# Patient Record
Sex: MALE | Race: WHITE
[De-identification: names, ages, dates, MRNs, and addresses within clinical notes are randomized per-mention and may not be internally consistent; named-entity substitution may affect disease eponyms.]

---

## 2018-05-11 ENCOUNTER — HOSPITAL ENCOUNTER (OUTPATIENT)
Dept: HOSPITAL 47 - LABWHC1 | Age: 43
Discharge: HOME | End: 2018-05-11
Payer: MEDICARE

## 2018-05-11 DIAGNOSIS — I10: ICD-10-CM

## 2018-05-11 DIAGNOSIS — E03.9: Primary | ICD-10-CM

## 2018-05-11 DIAGNOSIS — M10.9: ICD-10-CM

## 2018-05-11 LAB
ALBUMIN SERPL-MCNC: 4.3 G/DL (ref 3.5–5)
ALP SERPL-CCNC: 91 U/L (ref 38–126)
ALT SERPL-CCNC: 89 U/L (ref 21–72)
ANION GAP SERPL CALC-SCNC: 15 MMOL/L
AST SERPL-CCNC: 47 U/L (ref 17–59)
BASOPHILS # BLD AUTO: 0 K/UL (ref 0–0.2)
BASOPHILS NFR BLD AUTO: 0 %
BUN SERPL-SCNC: 12 MG/DL (ref 9–20)
CALCIUM SPEC-MCNC: 10 MG/DL (ref 8.4–10.2)
CHLORIDE SERPL-SCNC: 103 MMOL/L (ref 98–107)
CHOLEST SERPL-MCNC: 159 MG/DL (ref ?–200)
CO2 SERPL-SCNC: 25 MMOL/L (ref 22–30)
EOSINOPHIL # BLD AUTO: 0.1 K/UL (ref 0–0.7)
EOSINOPHIL NFR BLD AUTO: 1 %
ERYTHROCYTE [DISTWIDTH] IN BLOOD BY AUTOMATED COUNT: 5.36 M/UL (ref 4.3–5.9)
ERYTHROCYTE [DISTWIDTH] IN BLOOD: 12.6 % (ref 11.5–15.5)
GLUCOSE SERPL-MCNC: 97 MG/DL (ref 74–99)
HBA1C MFR BLD: 6.1 % (ref 4–6)
HCT VFR BLD AUTO: 48.1 % (ref 39–53)
HDLC SERPL-MCNC: 39 MG/DL (ref 40–60)
HGB BLD-MCNC: 16.3 GM/DL (ref 13–17.5)
LDLC SERPL CALC-MCNC: 94 MG/DL (ref 0–99)
LYMPHOCYTES # SPEC AUTO: 1.5 K/UL (ref 1–4.8)
LYMPHOCYTES NFR SPEC AUTO: 26 %
MCH RBC QN AUTO: 30.4 PG (ref 25–35)
MCHC RBC AUTO-ENTMCNC: 33.8 G/DL (ref 31–37)
MCV RBC AUTO: 89.8 FL (ref 80–100)
MONOCYTES # BLD AUTO: 0.4 K/UL (ref 0–1)
MONOCYTES NFR BLD AUTO: 6 %
NEUTROPHILS # BLD AUTO: 3.9 K/UL (ref 1.3–7.7)
NEUTROPHILS NFR BLD AUTO: 66 %
PLATELET # BLD AUTO: 185 K/UL (ref 150–450)
POTASSIUM SERPL-SCNC: 4.4 MMOL/L (ref 3.5–5.1)
PROT SERPL-MCNC: 7.6 G/DL (ref 6.3–8.2)
SODIUM SERPL-SCNC: 143 MMOL/L (ref 137–145)
TRIGL SERPL-MCNC: 130 MG/DL (ref ?–150)
URATE SERPL-MCNC: 8.6 MG/DL (ref 3.5–8.5)
WBC # BLD AUTO: 5.9 K/UL (ref 3.8–10.6)

## 2018-05-11 PROCEDURE — 80061 LIPID PANEL: CPT

## 2018-05-11 PROCEDURE — 80053 COMPREHEN METABOLIC PANEL: CPT

## 2018-05-11 PROCEDURE — 85025 COMPLETE CBC W/AUTO DIFF WBC: CPT

## 2018-05-11 PROCEDURE — 84443 ASSAY THYROID STIM HORMONE: CPT

## 2018-05-11 PROCEDURE — 36415 COLL VENOUS BLD VENIPUNCTURE: CPT

## 2018-05-11 PROCEDURE — 83036 HEMOGLOBIN GLYCOSYLATED A1C: CPT

## 2018-05-11 PROCEDURE — 84550 ASSAY OF BLOOD/URIC ACID: CPT

## 2019-03-18 ENCOUNTER — HOSPITAL ENCOUNTER (INPATIENT)
Dept: HOSPITAL 47 - EC | Age: 44
LOS: 1 days | Discharge: ANOTHER HOSPITAL | DRG: 291 | End: 2019-03-19
Attending: HOSPITALIST | Admitting: HOSPITALIST
Payer: MEDICARE

## 2019-03-18 DIAGNOSIS — I42.8: ICD-10-CM

## 2019-03-18 DIAGNOSIS — J96.01: ICD-10-CM

## 2019-03-18 DIAGNOSIS — I47.1: ICD-10-CM

## 2019-03-18 DIAGNOSIS — I13.0: Primary | ICD-10-CM

## 2019-03-18 DIAGNOSIS — J45.909: ICD-10-CM

## 2019-03-18 DIAGNOSIS — E66.01: ICD-10-CM

## 2019-03-18 DIAGNOSIS — E78.5: ICD-10-CM

## 2019-03-18 DIAGNOSIS — M10.9: ICD-10-CM

## 2019-03-18 DIAGNOSIS — I48.91: ICD-10-CM

## 2019-03-18 DIAGNOSIS — Z91.041: ICD-10-CM

## 2019-03-18 DIAGNOSIS — Z86.19: ICD-10-CM

## 2019-03-18 DIAGNOSIS — I50.23: ICD-10-CM

## 2019-03-18 DIAGNOSIS — Z77.120: ICD-10-CM

## 2019-03-18 DIAGNOSIS — E87.1: ICD-10-CM

## 2019-03-18 DIAGNOSIS — F79: ICD-10-CM

## 2019-03-18 DIAGNOSIS — M19.90: ICD-10-CM

## 2019-03-18 DIAGNOSIS — F41.0: ICD-10-CM

## 2019-03-18 DIAGNOSIS — R57.0: ICD-10-CM

## 2019-03-18 DIAGNOSIS — N18.9: ICD-10-CM

## 2019-03-18 DIAGNOSIS — I44.1: ICD-10-CM

## 2019-03-18 DIAGNOSIS — G47.30: ICD-10-CM

## 2019-03-18 DIAGNOSIS — N17.0: ICD-10-CM

## 2019-03-18 DIAGNOSIS — G47.00: ICD-10-CM

## 2019-03-18 DIAGNOSIS — Z79.4: ICD-10-CM

## 2019-03-18 DIAGNOSIS — Z79.899: ICD-10-CM

## 2019-03-18 DIAGNOSIS — T50.8X5A: ICD-10-CM

## 2019-03-18 DIAGNOSIS — E87.5: ICD-10-CM

## 2019-03-18 DIAGNOSIS — E11.22: ICD-10-CM

## 2019-03-18 DIAGNOSIS — Z79.82: ICD-10-CM

## 2019-03-18 DIAGNOSIS — I48.92: ICD-10-CM

## 2019-03-18 DIAGNOSIS — H53.489: ICD-10-CM

## 2019-03-18 DIAGNOSIS — E87.2: ICD-10-CM

## 2019-03-18 DIAGNOSIS — I34.0: ICD-10-CM

## 2019-03-18 DIAGNOSIS — K59.00: ICD-10-CM

## 2019-03-18 DIAGNOSIS — Z79.51: ICD-10-CM

## 2019-03-18 LAB
ALBUMIN SERPL-MCNC: 3.7 G/DL (ref 3.5–5)
ALP SERPL-CCNC: 132 U/L (ref 38–126)
ALT SERPL-CCNC: 367 U/L (ref 21–72)
ANION GAP SERPL CALC-SCNC: 13 MMOL/L
APTT BLD: 23.7 SEC (ref 22–30)
AST SERPL-CCNC: 131 U/L (ref 17–59)
BASOPHILS # BLD AUTO: 0.1 K/UL (ref 0–0.2)
BASOPHILS NFR BLD AUTO: 1 %
BUN SERPL-SCNC: 27 MG/DL (ref 9–20)
CALCIUM SPEC-MCNC: 8.9 MG/DL (ref 8.4–10.2)
CHLORIDE SERPL-SCNC: 94 MMOL/L (ref 98–107)
CO2 SERPL-SCNC: 24 MMOL/L (ref 22–30)
D DIMER PPP FEU-MCNC: 10.15 MG/L FEU (ref ?–0.6)
EOSINOPHIL # BLD AUTO: 0.2 K/UL (ref 0–0.7)
EOSINOPHIL NFR BLD AUTO: 2 %
ERYTHROCYTE [DISTWIDTH] IN BLOOD BY AUTOMATED COUNT: 4.92 M/UL (ref 4.3–5.9)
ERYTHROCYTE [DISTWIDTH] IN BLOOD: 14.3 % (ref 11.5–15.5)
GLUCOSE BLD-MCNC: 134 MG/DL (ref 75–99)
GLUCOSE BLD-MCNC: 148 MG/DL (ref 75–99)
GLUCOSE SERPL-MCNC: 126 MG/DL (ref 74–99)
HCT VFR BLD AUTO: 46.7 % (ref 39–53)
HGB BLD-MCNC: 14.7 GM/DL (ref 13–17.5)
INR PPP: 1.2 (ref ?–1.2)
LYMPHOCYTES # SPEC AUTO: 1.5 K/UL (ref 1–4.8)
LYMPHOCYTES NFR SPEC AUTO: 15 %
MAGNESIUM SPEC-SCNC: 2.1 MG/DL (ref 1.6–2.3)
MCH RBC QN AUTO: 29.9 PG (ref 25–35)
MCHC RBC AUTO-ENTMCNC: 31.5 G/DL (ref 31–37)
MCV RBC AUTO: 94.9 FL (ref 80–100)
MONOCYTES # BLD AUTO: 0.7 K/UL (ref 0–1)
MONOCYTES NFR BLD AUTO: 7 %
NEUTROPHILS # BLD AUTO: 7.5 K/UL (ref 1.3–7.7)
NEUTROPHILS NFR BLD AUTO: 75 %
PLATELET # BLD AUTO: 249 K/UL (ref 150–450)
POTASSIUM SERPL-SCNC: 4.5 MMOL/L (ref 3.5–5.1)
PROT SERPL-MCNC: 7 G/DL (ref 6.3–8.2)
PT BLD: 12.4 SEC (ref 9–12)
SODIUM SERPL-SCNC: 131 MMOL/L (ref 137–145)
WBC # BLD AUTO: 10.1 K/UL (ref 3.8–10.6)

## 2019-03-18 PROCEDURE — 80306 DRUG TEST PRSMV INSTRMNT: CPT

## 2019-03-18 PROCEDURE — 83735 ASSAY OF MAGNESIUM: CPT

## 2019-03-18 PROCEDURE — 94002 VENT MGMT INPAT INIT DAY: CPT

## 2019-03-18 PROCEDURE — 99291 CRITICAL CARE FIRST HOUR: CPT

## 2019-03-18 PROCEDURE — 96368 THER/DIAG CONCURRENT INF: CPT

## 2019-03-18 PROCEDURE — 83880 ASSAY OF NATRIURETIC PEPTIDE: CPT

## 2019-03-18 PROCEDURE — 93306 TTE W/DOPPLER COMPLETE: CPT

## 2019-03-18 PROCEDURE — 81001 URINALYSIS AUTO W/SCOPE: CPT

## 2019-03-18 PROCEDURE — 96376 TX/PRO/DX INJ SAME DRUG ADON: CPT

## 2019-03-18 PROCEDURE — 85730 THROMBOPLASTIN TIME PARTIAL: CPT

## 2019-03-18 PROCEDURE — 76770 US EXAM ABDO BACK WALL COMP: CPT

## 2019-03-18 PROCEDURE — 85610 PROTHROMBIN TIME: CPT

## 2019-03-18 PROCEDURE — 36415 COLL VENOUS BLD VENIPUNCTURE: CPT

## 2019-03-18 PROCEDURE — 94640 AIRWAY INHALATION TREATMENT: CPT

## 2019-03-18 PROCEDURE — 84484 ASSAY OF TROPONIN QUANT: CPT

## 2019-03-18 PROCEDURE — 96365 THER/PROPH/DIAG IV INF INIT: CPT

## 2019-03-18 PROCEDURE — 80061 LIPID PANEL: CPT

## 2019-03-18 PROCEDURE — 71046 X-RAY EXAM CHEST 2 VIEWS: CPT

## 2019-03-18 PROCEDURE — 85379 FIBRIN DEGRADATION QUANT: CPT

## 2019-03-18 PROCEDURE — 96366 THER/PROPH/DIAG IV INF ADDON: CPT

## 2019-03-18 PROCEDURE — 71275 CT ANGIOGRAPHY CHEST: CPT

## 2019-03-18 PROCEDURE — 82805 BLOOD GASES W/O2 SATURATION: CPT

## 2019-03-18 PROCEDURE — 85025 COMPLETE CBC W/AUTO DIFF WBC: CPT

## 2019-03-18 PROCEDURE — 71045 X-RAY EXAM CHEST 1 VIEW: CPT

## 2019-03-18 PROCEDURE — 80053 COMPREHEN METABOLIC PANEL: CPT

## 2019-03-18 PROCEDURE — 87502 INFLUENZA DNA AMP PROBE: CPT

## 2019-03-18 PROCEDURE — 84100 ASSAY OF PHOSPHORUS: CPT

## 2019-03-18 PROCEDURE — 96375 TX/PRO/DX INJ NEW DRUG ADDON: CPT

## 2019-03-18 PROCEDURE — 96360 HYDRATION IV INFUSION INIT: CPT

## 2019-03-18 PROCEDURE — 80048 BASIC METABOLIC PNL TOTAL CA: CPT

## 2019-03-18 RX ADMIN — HEPARIN SODIUM SCH MLS/HR: 10000 INJECTION, SOLUTION INTRAVENOUS at 11:37

## 2019-03-18 RX ADMIN — FUROSEMIDE SCH MLS/HR: 10 INJECTION, SOLUTION INTRAMUSCULAR; INTRAVENOUS at 17:39

## 2019-03-18 RX ADMIN — NICARDIPINE HYDROCHLORIDE SCH MLS/HR: 2.5 INJECTION INTRAVENOUS at 14:41

## 2019-03-18 RX ADMIN — DOCUSATE SODIUM SCH MG: 100 CAPSULE, LIQUID FILLED ORAL at 21:04

## 2019-03-18 RX ADMIN — FLUTICASONE PROPIONATE SCH PUFF: 44 AEROSOL, METERED RESPIRATORY (INHALATION) at 21:23

## 2019-03-18 RX ADMIN — NITROGLYCERIN SCH INCH: 20 OINTMENT TOPICAL at 17:37

## 2019-03-18 NOTE — XR
EXAMINATION TYPE: XR chest 2V

 

DATE OF EXAM: 3/18/2019

 

COMPARISON: 6/21/2016

 

HISTORY: Shortness of breath

 

TECHNIQUE:  Frontal and lateral views of the chest are obtained.

 

FINDINGS:  There is enlargement of the cardiomediastinal silhouette, although exaggerated by techniqu
e this appears to have increased from the prior of 6/21/2016. Mild interstitial pulmonary edema and p
ulmonary vascular congestion is also seen throughout. Left midlung bandlike atelectasis is also seen.
 Osseous structures are grossly intact.

 

IMPRESSION:  Sequela of interstitial phase of decompensated congestive heart failure. Additional band
like left midlung atelectasis is seen. Given the interval increase in cardiomegaly in comparison to t
he prior echocardiogram could assess for additional component of a possible pericardial effusion.

## 2019-03-18 NOTE — CONS
CONSULTATION



This is a 43-year-old gentleman who was brought to the emergency room with complaints

of chest pain and shortness of breath.  EN route, EKG raised the possibility of

precordial ST elevation.  However, I came down to the emergency room as soon as he

arrived and we looked at the EKG.  There is no evidence to suggest any ST-elevation MI.

He does have evidence of what seems to be an atrial fibrillation with moderate

ventricular rate and LVH by voltage criteria with nonspecific ST-T changes.

Possibility of a flutter with a 2:1 conduction also should be considered.  The patient

is not a very good historian.  He really does not give me any meaningful history.  He

indicates to me that he was trying to go to his doctor today and he developed shortness

of breath.  He does not remember the name of the doctor.  He does have diabetes.  He

has what seems to be hypertension and also underlying mental health issues as well.  He

indicates to me that he has hypertension and he also has type 2 diabetes and takes

metformin and also a cholesterol medication.  At the time of my evaluation, he has no

chest pain. His  shortness of breath has improved.  He feels better.  He denies any

chest discomfort.



PAST MEDICAL HISTORY:

Probably has type 2 diabetes, obesity and hyperlipidemia.  He may have underlying

mental health issues, but it is unclear and I cannot obtain any meaningful history.



SOCIAL HISTORY:

The best information I can get from him is that he is not a smoker and he does consume

alcohol occasionally.  He has no history of any recreational drug use.



MEDICATIONS:

Medications include metformin 500 mg daily, simvastatin 10 mg daily. He takes Cymbalta

and also takes some inhalers.  He takes aspirin 1 tablet daily.



ALLERGIES:

He is allergic to IODINE CONTRAST.



REVIEW OF SYSTEMS:

Unable to obtain a meaningful history, but patient is not in any distress.  He denies

any hematemesis, melena, genitourinary symptoms, fever with chills or cough with

expectoration.



PHYSICAL EXAMINATION:

On examination, blood pressure is 118/70, pulse rate is about 124 per minute irregular:

HEENT: Unremarkable. Fundus was not examined by me.

Neck is supple. There is JVD of 1 cm.  No carotid bruit.

Heart exam reveals S1, S2 with tachycardia.  No significant murmurs.

Lungs reveal bilateral diminished air entry over both bases.

Abdomen is soft, nontender.

Lower extremities reveal trace edema and palpable pulses.

Central nervous system grossly within normal limits.



EKG revealed atrial flutter with 2:1 with evidence of some nonspecific ST changes and

voltage criteria for LVH.



I reviewed the old chart and noted that this patient apparently had a CT angiography

performed about nearly 3 years ago and this was unremarkable at that time and there was

no clear-cut evidence of pulmonary embolism, although the study was suboptimal.



IMPRESSION:

1. Episode of chest pain and shortness of breath.  Chest pain has resolved. Cannot

    exclude any pulmonary embolism type picture.

2. Atrial flutter with a 2:1 block.

3. Obesity.

4. Type 2 diabetes.

5. History of hypertension and hyperlipidemia.



RECOMMENDATIONS:

I am recommending that we heparinize the patient, initiate him on a Cardizem drip and

bolus at 7.5 mg bolus and drip.  Additionally, I will place him on a beta blocker,

obtain echocardiogram tomorrow and await the results of a D-dimer and troponin.

Discussed my thoughts in detail with the patient.  There was no family available.  I am

not sure how well he comprehends his current situation.



Thank you very much for the consult.





MMTRINIDADL / IJN: [id number] / Job#: [id number]

## 2019-03-18 NOTE — ED
General Adult HPI





- General


Stated complaint: chest pain


Time Seen by Provider: 03/18/19 10:32


Source: RN notes reviewed





- History of Present Illness


Initial comments: 





This is a 43-year-old male who presents to the emergency department complaining 

of chest pain.  Patient states he had intermittent chest pain for a month.  

Patient states associated with the chest pain issues and some shortness of 

breath.  Patient denies any radiation of the pain.  Patient denies any recent 

fever chills or cough.  Patient denies any diaphoretic episodes.  Patient states

this episode of chest pain started approximately one half hour prior to coming 

to the emergency department.  Patient states is continuing currently.  Patient 

denies any shortness of breath as he lies her.  Patient denies abdominal pain 

patient denies any vomiting or diarrhea recently.  Patient denies any headache 

patient denies numbness weakness.  Patient denies any lightheadedness dizziness 

or near syncopal episode.  She denies any calf tenderness or leg swelling





- Related Data


                                Home Medications











 Medication  Instructions  Recorded  Confirmed


 


Aspirin 325 mg PO DAILY 12/05/17 03/18/19


 


Beclomethasone Dipropionate [Qvar 1 puff INHALATION RT-BID 03/18/19 03/18/19





40 mcg Redihaler]   


 


DULoxetine HCL [Cymbalta] 30 mg PO DAILY 03/18/19 03/18/19


 


DULoxetine HCL [Cymbalta] 60 mg PO DAILY 03/18/19 03/18/19


 


Diclofenac Potassium [Cataflam] 50 mg PO BID 03/18/19 03/18/19


 


Simvastatin 10 mg PO HS 03/18/19 03/18/19


 


metFORMIN HCL [Glucophage] 500 mg PO DAILY 03/18/19 03/18/19











                                    Allergies











Allergy/AdvReac Type Severity Reaction Status Date / Time


 


Iodinated Contrast- Oral and Allergy  Rash/Hives Verified 03/18/19 11:32





IV Dye     





[Iodinated Contrast Media -     





IV Dye]     














Review of Systems


ROS Statement: 


Those systems with pertinent positive or pertinent negative responses have been 

documented in the HPI.





ROS Other: All systems not noted in ROS Statement are negative.





Past Medical History


Additional Past Medical History / Comment(s): had Hep C and DM2, both resolved 

per patient


History of Any Multi-Drug Resistant Organisms: None Reported


Past Surgical History: Orthopedic Surgery


Past Psychological History: Anxiety


Smoking Status: Never smoker


Past Alcohol Use History: None Reported


Past Drug Use History: None Reported





General Exam





- General Exam Comments


Initial Comments: 





GENERAL:


Patient is well-developed and well-nourished.  Patient is nontoxic and well-

hydrated and is in mild distress.





ENT:


Neck is soft and supple.  No significant lymphadenopathy is noted.  Oropharynx 

is clear.  Moist mucous membranes.  Neck has full range of motion without e

liciting any pain. 





EYES:


The sclera were anicteric and conjunctiva were pink and moist.  Extraocular 

movements were intact and pupils were equal round and reactive to light.  

Eyelids were unremarkable.





PULMONARY:


Unlabored respirations.  Good breath sounds bilaterally.  No audible rales 

rhonchi or wheezing was noted.





CARDIOVASCULAR:


There is a regular rate and rhythm without any murmurs gallops or rubs.   





ABDOMEN:


Soft and nontender with normal bowel sounds.  No palpable organomegaly was n

oted.  There is no palpable pulsatile mass.





SKIN:


Skin is clear with no lesions or rashes and otherwise unremarkable.





NEUROLOGIC:


Patient is alert and oriented x3.  Cranial nerves II through XII are grossly 

intact.  Motor and sensory are also intact.  Normal speech, volume and content. 

Symmetrical smile.  





MUSCULOSKELETAL:


Normal extremities with adequate strength and full range of motion.  No lower 

extremity swelling or edema.  No calf tenderness.





LYMPHATICS:


No significant lymphadenopathy is noted





PSYCHIATRIC:


Normal psychiatric evaluation.  N





Course


                                   Vital Signs











  03/18/19 03/18/19 03/18/19





  10:40 11:41 12:00


 


Temperature 98.6 F  


 


Pulse Rate 134 H 133 H 133 H


 


Respiratory 24 20 25 H





Rate   


 


Blood Pressure 115/96 105/83 105/83


 


O2 Sat by Pulse 98 95 





Oximetry   














  03/18/19 03/18/19





  13:00 13:30


 


Temperature  


 


Pulse Rate 124 H 124 H


 


Respiratory 23 20





Rate  


 


Blood Pressure 92/65 80/70


 


O2 Sat by Pulse 96 98





Oximetry  














Medical Decision Making





- Medical Decision Making





EKG shows atrial flutter with a 2-1 block at a rate of 134 beats a minute QRS is

112 QTC intervals 320 QTC is 489.  No significant ST segment elevation is noted 

on this EKG.





Based on EKG done by EMS I did speak with Dr. Reza he stated he will come down 

and see the patient.





I gave the patient heparin a beta blocker and place the patient a Cardizem drip





Chest x-ray shows interstitial edema.





CT of the chest shows no PE but again some interstitial edema.





I spoke with Dr. Brennan he agreed to admit the patient admitted the patient I 

wrote admitting orders.  The patient's blood pressure got a little low so I stop

the Cardizem.





At this point time Lasix was held because the patient's blood pressure was a 

little bit low.





- Lab Data


Result diagrams: 


                                 03/18/19 10:39





                                 03/18/19 10:39


                                   Lab Results











  03/18/19 03/18/19 03/18/19 Range/Units





  10:39 10:39 10:39 


 


WBC  10.1    (3.8-10.6)  k/uL


 


RBC  4.92    (4.30-5.90)  m/uL


 


Hgb  14.7    (13.0-17.5)  gm/dL


 


Hct  46.7    (39.0-53.0)  %


 


MCV  94.9    (80.0-100.0)  fL


 


MCH  29.9    (25.0-35.0)  pg


 


MCHC  31.5    (31.0-37.0)  g/dL


 


RDW  14.3    (11.5-15.5)  %


 


Plt Count  249    (150-450)  k/uL


 


Neutrophils %  75    %


 


Lymphocytes %  15    %


 


Monocytes %  7    %


 


Eosinophils %  2    %


 


Basophils %  1    %


 


Neutrophils #  7.5    (1.3-7.7)  k/uL


 


Lymphocytes #  1.5    (1.0-4.8)  k/uL


 


Monocytes #  0.7    (0-1.0)  k/uL


 


Eosinophils #  0.2    (0-0.7)  k/uL


 


Basophils #  0.1    (0-0.2)  k/uL


 


Hypochromasia  Slight    


 


PT    12.4 H  (9.0-12.0)  sec


 


INR    1.2 H  (<1.2)  


 


APTT    23.7  (22.0-30.0)  sec


 


D-Dimer    10.15 H  (<0.60)  mg/L FEU


 


Sodium   131 L   (137-145)  mmol/L


 


Potassium   4.5   (3.5-5.1)  mmol/L


 


Chloride   94 L   ()  mmol/L


 


Carbon Dioxide   24   (22-30)  mmol/L


 


Anion Gap   13   mmol/L


 


BUN   27 H   (9-20)  mg/dL


 


Creatinine   1.15   (0.66-1.25)  mg/dL


 


Est GFR (CKD-EPI)AfAm   89   (>60 ml/min/1.73 sqM)  


 


Est GFR (CKD-EPI)NonAf   77   (>60 ml/min/1.73 sqM)  


 


Glucose   126 H   (74-99)  mg/dL


 


Calcium   8.9   (8.4-10.2)  mg/dL


 


Magnesium   2.1   (1.6-2.3)  mg/dL


 


Total Bilirubin   2.0 H   (0.2-1.3)  mg/dL


 


AST   131 H   (17-59)  U/L


 


ALT   367 H   (21-72)  U/L


 


Alkaline Phosphatase   132 H   ()  U/L


 


Troponin I     (0.000-0.034)  ng/mL


 


NT-Pro-B Natriuret Pep     pg/mL


 


Total Protein   7.0   (6.3-8.2)  g/dL


 


Albumin   3.7   (3.5-5.0)  g/dL


 


Influenza Type A RNA     (Not Detectd)  


 


Influenza Type B (PCR)     (Not Detectd)  














  03/18/19 03/18/19 03/18/19 Range/Units





  10:39 10:45 12:49 


 


WBC     (3.8-10.6)  k/uL


 


RBC     (4.30-5.90)  m/uL


 


Hgb     (13.0-17.5)  gm/dL


 


Hct     (39.0-53.0)  %


 


MCV     (80.0-100.0)  fL


 


MCH     (25.0-35.0)  pg


 


MCHC     (31.0-37.0)  g/dL


 


RDW     (11.5-15.5)  %


 


Plt Count     (150-450)  k/uL


 


Neutrophils %     %


 


Lymphocytes %     %


 


Monocytes %     %


 


Eosinophils %     %


 


Basophils %     %


 


Neutrophils #     (1.3-7.7)  k/uL


 


Lymphocytes #     (1.0-4.8)  k/uL


 


Monocytes #     (0-1.0)  k/uL


 


Eosinophils #     (0-0.7)  k/uL


 


Basophils #     (0-0.2)  k/uL


 


Hypochromasia     


 


PT     (9.0-12.0)  sec


 


INR     (<1.2)  


 


APTT     (22.0-30.0)  sec


 


D-Dimer     (<0.60)  mg/L FEU


 


Sodium     (137-145)  mmol/L


 


Potassium     (3.5-5.1)  mmol/L


 


Chloride     ()  mmol/L


 


Carbon Dioxide     (22-30)  mmol/L


 


Anion Gap     mmol/L


 


BUN     (9-20)  mg/dL


 


Creatinine     (0.66-1.25)  mg/dL


 


Est GFR (CKD-EPI)AfAm     (>60 ml/min/1.73 sqM)  


 


Est GFR (CKD-EPI)NonAf     (>60 ml/min/1.73 sqM)  


 


Glucose     (74-99)  mg/dL


 


Calcium     (8.4-10.2)  mg/dL


 


Magnesium     (1.6-2.3)  mg/dL


 


Total Bilirubin     (0.2-1.3)  mg/dL


 


AST     (17-59)  U/L


 


ALT     (21-72)  U/L


 


Alkaline Phosphatase     ()  U/L


 


Troponin I  0.035 H*    (0.000-0.034)  ng/mL


 


NT-Pro-B Natriuret Pep   6080   pg/mL


 


Total Protein     (6.3-8.2)  g/dL


 


Albumin     (3.5-5.0)  g/dL


 


Influenza Type A RNA    Not Detected  (Not Detectd)  


 


Influenza Type B (PCR)    Not Detected  (Not Detectd)  














Critical Care Time


Critical Care Time: Yes


Total Critical Care Time: 35





Disposition


Clinical Impression: 


 New onset atrial flutter, Unstable angina, Pulmonary edema





Disposition: ADMITTED AS IP TO THIS HOSP


Referrals: 


Ning Ivy MD [Primary Care Provider] - 1-2 days


Time of Disposition: 13:54

## 2019-03-18 NOTE — CT
EXAMINATION TYPE: CT chest angio for PE

 

DATE OF EXAM: 3/18/2019

 

COMPARISON: 6/21/2016

 

HISTORY: SOB, chest pain

 

CT DLP: 977.7 mGycm. Automated Exposure Control for Dose Reduction was Utilized.

 

 

CONTRAST: 

CTA scan of the thorax is performed with IV Contrast, patient injected with 100 mL of Isovue 370, pul
monary embolism protocol.  MIP Images are created on CT scanner and reviewed.

 

FINDINGS: There is extensive patient motion artifact is respiratory artifact limiting evaluation.

 

LUNGS: There is a small left pleural effusion and trace right pleural effusion with interstitial pulm
onary edema. Lingular elongated atelectasis is seen as well as bibasilar subsegmental atelectasis. Ex
tensive patient motion limits evaluation for underlying pulmonary nodule. Bandlike scarring is seen w
ithin the right middle lobe multifocally.

 

MEDIASTINUM: There is satisfactory enhancement of the central and segmental pulmonary arteries, there
 is no CT evidence for pulmonary embolism within these arteries. There is limitation in evaluation of
 the subsegmental pulmonary arteries for embolus. There are no greater than 1 cm hilar or mediastinal
 lymph nodes. There is four-chamber cardiomegaly with reflux of contrast into the inferior vena cava 
and hepatic veins.

 

OTHER: Mild symmetric retroareolar probable gynecomastia is seen. There is small volume abdominal asc
ites and diffuse haziness of the mesentery compatible with venous mesenteric congestion. The liver is
 elongated. Motion artifact makes evaluation of the contour the liver difficult however there appears
 to be a slight nodular contour. Underlying hepatocellular disease should be correlated with liver fu
nction tests. Mild diffuse anasarca is noted. Minimal degenerative changes of the spine are present.

 

IMPRESSION:

1. No central or segmental pulmonary embolus. There is limitation in evaluation of the subsegmental p
ulmonary arteries.

2. Cardiomegaly, pleural effusions, interstitial edema, and evidence of right heart failure. There is
 also infradiaphragmatic fluid overload with mild volume ascites, mesenteric congestion, and anasarca
 noted.

## 2019-03-18 NOTE — HP
HISTORY AND PHYSICAL



DATE OF SERVICE:

03/18/2019



CHIEF COMPLAINT:

Chest pain.



HISTORY OF PRESENT ILLNESS:

This 43-year-old gentleman with a past medical history of multiple medical problems

including history of hepatitis C, history of diabetes type 2, history of anxiety being

followed by Dr. Ning Ivy and Floyd Calle in the outpatient setting was

complaining of chest pain which was left-sided.  The patient also complained shortness

of breath. The patient came to [facility].  Patient was found to have atrial

flutter with a 2-1 AV block and the patient was admitted for further evaluation and

treatment.  The chest x-ray was done in the the ER which showed significant

cardiomegaly, some interstitial changes which was reviewed by me.  There is no history

of fever, rigors or chills.  No history of headache, loss of consciousness or seizures.



PAST MEDICAL HISTORY:

History of hepatitis C, diabetes mellitus type 2, history of anxiety.



MEDICATIONS:

Home medications are reviewed include:

1. Metformin 500 mg b.i.d.

2. (  ) 100 mg q.h.s.

3. Cataflam 50 mg b.i.d.

4. Cymbalta 90 mg daily.

5. Qvar 40 mg 1 puff b.i.d.

6. Aspirin 325 mg daily.



ALLERGIES:

IODINE CONTRAST DYES.



FAMILY HISTORY:

No history of heart disease or strokes.



SOCIAL HISTORY:

No history of smoking.  No history of alcohol.



REVIEW OF SYSTEMS:

ENT: No diminished hearing or vision.

CARDIOVASCULAR: As mentioned earlier.

RESPIRATORY:  As mentioned earlier.

GI:  No nausea, no vomiting.

:  No dysuria.

NERVOUS SYSTEM: No numbness, weakness.

ALLERGY/IMMUNOLOGY:  No asthma or hayfever.

MUSCULOSKELETAL:  As mentioned earlier.

HEMATOLOGY/ONCOLOGY:  Anemia.

ENDOCRINE:  History of diabetes.  No hypothyroidism.

CONSTITUTIONAL:  As mentioned earlier.

PSYCHIATRY:  As mentioned earlier.



PHYSICAL EXAMINATION:

Alert and oriented x3, pulse 124, irregular; blood pressure 115/84, respiration 20,

temperature 97.2, pulse ox 97% on 3 L.

HEENT:  Conjunctivae normal. Oral mucosa moist.

NECK:  No jugular venous distention.  No lymph node enlargement.

CARDIOVASCULAR:  S1, S2.  S3 present.  No murmur.

RESPIRATORY: Diminished breath sounds at the bases. A few scattered rhonchi and

crackles.

ABDOMEN: Soft, obese, nontender.  No mass palpable.

LEGS: Minimal edema.

NERVOUS SYSTEM: Higher functions as mentioned.  Moves all four limbs.

LYMPHATICS:  No lymph node palpable in neck or axillae.

SKIN:  No rash.

JOINTS: No active deforming arthropathy.



LABS:

At this time shows troponin 0.03.  Influenza is negative.  Sodium is 131, potassium

4.5. NT proBNP is 6080.  AST/ALT elevated.



ASSESSMENT:

1. Chest pain for evaluation, possible unstable angina.

2. Possible congestive heart failure acute exacerbation.  Ejection fraction unknown.

3. Atrial flutter with 2-1 AV block.

4. Obesity with body mass index of 32.5.

5. Diabetes mellitus type 2.

6. History of hepatitis C.

7. Increased AST, ALT and bilirubin, possibly secondary to hepatitis C.

8. Hyponatremia.

9. Increased BUN.

10.History of anxiety and panic attacks.

11.FULL CODE.



RECOMMENDATION AND DISCUSSION:

In this 43-year-old gentleman who presented with multiple complex medical issues, will

monitor the patient closely, continue the current management, continue symptomatic

treatment.  We will initiate IV heparin and otherwise we will also treat the patient

with Lasix drip at 10 mg/hour and the patient transferred to ICU.  Monitor fluid and

electrolytes balance closely.  Check magnesium, potassium.  Guarded prognosis.  2D echo

with Doppler.  Repeat labs.  Prognosis guarded because of multiple complex medical

issues. Further recommendations to follow.





MMODL / IJN: [id number] / Job#: [id number]

## 2019-03-19 VITALS — TEMPERATURE: 97.8 F

## 2019-03-19 VITALS — RESPIRATION RATE: 27 BRPM | HEART RATE: 96 BPM

## 2019-03-19 VITALS — SYSTOLIC BLOOD PRESSURE: 136 MMHG | DIASTOLIC BLOOD PRESSURE: 87 MMHG

## 2019-03-19 LAB
ALBUMIN SERPL-MCNC: 3.4 G/DL (ref 3.5–5)
ALP SERPL-CCNC: 127 U/L (ref 38–126)
ALT SERPL-CCNC: 316 U/L (ref 21–72)
ANION GAP SERPL CALC-SCNC: 12 MMOL/L
ANION GAP SERPL CALC-SCNC: 14 MMOL/L
AST SERPL-CCNC: 126 U/L (ref 17–59)
BASOPHILS # BLD AUTO: 0 K/UL (ref 0–0.2)
BASOPHILS NFR BLD AUTO: 0 %
BUN SERPL-SCNC: 36 MG/DL (ref 9–20)
BUN SERPL-SCNC: 43 MG/DL (ref 9–20)
CALCIUM SPEC-MCNC: 8.3 MG/DL (ref 8.4–10.2)
CALCIUM SPEC-MCNC: 8.6 MG/DL (ref 8.4–10.2)
CHLORIDE SERPL-SCNC: 94 MMOL/L (ref 98–107)
CHLORIDE SERPL-SCNC: 95 MMOL/L (ref 98–107)
CHOLEST SERPL-MCNC: 107 MG/DL (ref ?–200)
CO2 BLDA-SCNC: 20 MMOL/L (ref 19–24)
CO2 SERPL-SCNC: 18 MMOL/L (ref 22–30)
CO2 SERPL-SCNC: 20 MMOL/L (ref 22–30)
EOSINOPHIL # BLD AUTO: 0.1 K/UL (ref 0–0.7)
EOSINOPHIL NFR BLD AUTO: 1 %
ERYTHROCYTE [DISTWIDTH] IN BLOOD BY AUTOMATED COUNT: 4.7 M/UL (ref 4.3–5.9)
ERYTHROCYTE [DISTWIDTH] IN BLOOD: 14.2 % (ref 11.5–15.5)
GLUCOSE BLD-MCNC: 206 MG/DL (ref 75–99)
GLUCOSE BLD-MCNC: 217 MG/DL (ref 75–99)
GLUCOSE SERPL-MCNC: 136 MG/DL (ref 74–99)
GLUCOSE SERPL-MCNC: 226 MG/DL (ref 74–99)
HCO3 BLDA-SCNC: 19 MMOL/L (ref 21–25)
HCT VFR BLD AUTO: 44.9 % (ref 39–53)
HDLC SERPL-MCNC: 23 MG/DL (ref 40–60)
HGB BLD-MCNC: 13.6 GM/DL (ref 13–17.5)
LDLC SERPL CALC-MCNC: 73 MG/DL (ref 0–99)
LYMPHOCYTES # SPEC AUTO: 1 K/UL (ref 1–4.8)
LYMPHOCYTES NFR SPEC AUTO: 11 %
MAGNESIUM SPEC-SCNC: 2.3 MG/DL (ref 1.6–2.3)
MAGNESIUM SPEC-SCNC: 2.4 MG/DL (ref 1.6–2.3)
MCH RBC QN AUTO: 29 PG (ref 25–35)
MCHC RBC AUTO-ENTMCNC: 30.3 G/DL (ref 31–37)
MCV RBC AUTO: 95.6 FL (ref 80–100)
MONOCYTES # BLD AUTO: 0.4 K/UL (ref 0–1)
MONOCYTES NFR BLD AUTO: 5 %
NEUTROPHILS # BLD AUTO: 7.9 K/UL (ref 1.3–7.7)
NEUTROPHILS NFR BLD AUTO: 82 %
PCO2 BLDA: 38 MMHG (ref 35–45)
PH BLDA: 7.3 [PH] (ref 7.35–7.45)
PH UR: 5.5 [PH] (ref 5–8)
PLATELET # BLD AUTO: 205 K/UL (ref 150–450)
PO2 BLDA: 75 MMHG (ref 83–108)
POTASSIUM SERPL-SCNC: 5.4 MMOL/L (ref 3.5–5.1)
POTASSIUM SERPL-SCNC: 5.9 MMOL/L (ref 3.5–5.1)
PROT SERPL-MCNC: 6.5 G/DL (ref 6.3–8.2)
PROT UR QL: (no result)
RBC UR QL: 2 /HPF (ref 0–5)
SODIUM SERPL-SCNC: 126 MMOL/L (ref 137–145)
SODIUM SERPL-SCNC: 127 MMOL/L (ref 137–145)
SP GR UR: 1.04 (ref 1–1.03)
SQUAMOUS UR QL AUTO: <1 /HPF (ref 0–4)
TRIGL SERPL-MCNC: 55 MG/DL (ref ?–150)
UROBILINOGEN UR QL STRIP: 3 MG/DL (ref ?–2)
WBC # BLD AUTO: 9.6 K/UL (ref 3.8–10.6)
WBC #/AREA URNS HPF: 4 /HPF (ref 0–5)

## 2019-03-19 PROCEDURE — 03HY32Z INSERTION OF MONITORING DEVICE INTO UPPER ARTERY, PERCUTANEOUS APPROACH: ICD-10-PCS

## 2019-03-19 PROCEDURE — 0BH17EZ INSERTION OF ENDOTRACHEAL AIRWAY INTO TRACHEA, VIA NATURAL OR ARTIFICIAL OPENING: ICD-10-PCS

## 2019-03-19 PROCEDURE — 02HV33Z INSERTION OF INFUSION DEVICE INTO SUPERIOR VENA CAVA, PERCUTANEOUS APPROACH: ICD-10-PCS

## 2019-03-19 PROCEDURE — 4A133J1 MONITORING OF ARTERIAL PULSE, PERIPHERAL, PERCUTANEOUS APPROACH: ICD-10-PCS

## 2019-03-19 PROCEDURE — 5A2204Z RESTORATION OF CARDIAC RHYTHM, SINGLE: ICD-10-PCS

## 2019-03-19 PROCEDURE — 4A133B1 MONITORING OF ARTERIAL PRESSURE, PERIPHERAL, PERCUTANEOUS APPROACH: ICD-10-PCS

## 2019-03-19 PROCEDURE — 5A1935Z RESPIRATORY VENTILATION, LESS THAN 24 CONSECUTIVE HOURS: ICD-10-PCS

## 2019-03-19 RX ADMIN — NITROGLYCERIN SCH INCH: 20 OINTMENT TOPICAL at 01:08

## 2019-03-19 RX ADMIN — FLUTICASONE PROPIONATE SCH: 44 AEROSOL, METERED RESPIRATORY (INHALATION) at 15:13

## 2019-03-19 RX ADMIN — NICARDIPINE HYDROCHLORIDE SCH MLS/HR: 2.5 INJECTION INTRAVENOUS at 15:48

## 2019-03-19 RX ADMIN — FUROSEMIDE SCH MLS/HR: 10 INJECTION, SOLUTION INTRAMUSCULAR; INTRAVENOUS at 01:26

## 2019-03-19 RX ADMIN — DOBUTAMINE HYDROCHLORIDE SCH MLS/HR: 200 INJECTION INTRAVENOUS at 14:25

## 2019-03-19 RX ADMIN — DOCUSATE SODIUM SCH: 100 CAPSULE, LIQUID FILLED ORAL at 08:37

## 2019-03-19 RX ADMIN — HEPARIN SODIUM SCH MLS/HR: 10000 INJECTION, SOLUTION INTRAVENOUS at 08:36

## 2019-03-19 RX ADMIN — FUROSEMIDE SCH MLS/HR: 10 INJECTION, SOLUTION INTRAMUSCULAR; INTRAVENOUS at 11:31

## 2019-03-19 RX ADMIN — DOBUTAMINE HYDROCHLORIDE SCH MLS/HR: 200 INJECTION INTRAVENOUS at 08:36

## 2019-03-19 NOTE — XR
EXAMINATION TYPE: XR chest 1V

 

DATE OF EXAM: 3/19/2019

 

COMPARISON: 3/18/2019

 

HISTORY: Congestive heart burn, shortness of breath.

 

TECHNIQUE: Single frontal view of the chest is obtained.

 

FINDINGS:  There is a small layering left pleural effusion. Interstitial edema and left basilar airsp
ace disease are present. Degree of interstitial edema is similar to the prior. Cardiomegaly is again 
noted. There are low lung volumes overall. Osseous structures are grossly intact.

 

IMPRESSION:  Similar degree of interstitial pulmonary edema with small left pleural effusion and new 
retrocardiac left basilar airspace disease, likely confluent pulmonary edema and atelectasis secondar
y to the pleural effusion.

## 2019-03-19 NOTE — PCN
PROCEDURE NOTE



PROCEDURE:

Electrical cardioversion.



INDICATION:

Hemodynamic instability with atrial flutter/atrial tachycardia.



CLINICAL INFORMATION:

This patient is in cardiogenic shock with ejection fraction of less than 20%. He came

into the hospital yesterday and was placed on a Lasix drip and dobutamine drip, without

much improvement. He had a heart rate of about 120 beats per minute and the rhythm

appears to be either atrial tachycardia or atrial flutter with a 2:1 block.  Patient

was very well sedated and just intubated and given some propofol.  Since he was well

sedated, we placed anterior and posterior patches.  In a synchronized fashion, I gave

him a 100-joule shock and he converted to sinus rhythm at a rate of 70 beats per

minute.  Blood pressure showed modest improvement.  Results were discussed with the

patient's wife and friend.  Prognosis remains poor.  He will be transferred to [facility] for probable mechanical circulatory support.  Prognosis is poor.





MMODL / IJN: [id number] / Job#: [id number]

## 2019-03-19 NOTE — ECHOF
Referral Reason:Evaluate LV fuction. Study to be done in AM.



MEASUREMENTS

--------

HEIGHT: 182.9 cm

WEIGHT: 108.9 kg

BP: 98/68

RVIDd:   4.3 cm     (< 3.3)

IVSd:   1.0 cm     (0.6 - 1.1)

LVIDd:   5.6 cm     (3.9 - 5.3)

LVPWd:   1.1 cm     (0.6 - 1.1)

IVSs:   1.1 cm

LVIDs:   5.3 cm

LVPWs:   1.1 cm

LAESV Index (A-L):   41.68 ml/m

Ao Diam:   3.4 cm     (2.0 - 3.7)

AV Cusp:   2.0 cm     (1.5 - 2.6)

MV EXCURSION:   15.965 mm     (> 18.000)

MV EF SLOPE:   168 mm/s     (70 - 150)

EPSS:   1.8 cm

MV E Richardson:   0.98 m/s

MV DecT:   159 ms

MV A Richardson:   0.39 m/s

MV E/A Ratio:   2.51 

RAP:   5.00 mmHg

RVSP:   17.70 mmHg







FINDINGS

--------

Resting tachycardia (HR>100bpm).

This was a technically difficult study with suboptimal views.

The left ventricular size is normal.   There is borderline concentric left ventricular hypertrophy.  
 There is severe global hypokinesis of LV .   Overall left ventricular systolic function is severely 
impaired with, an EF < 20%.   Mitral Doppler inflow pattern suggests diastolic filling abnormality 19
.84.

The right ventricle is severely enlarged.

LA is severely dilated >40 ml/m2

RA appears enlarged.

3 ml of Lumason used

There is mild aortic valve sclerosis.   There is no evidence of aortic regurgitation.   There is no e
vidence of aortic stenosis.

The mitral valve leaflets are mildly thickened.   Moderate-to-severe mitral regurgitation is present.


Trace tricuspid regurgitation present.   Right ventricular systolic pressure is normal at < 35 mmHg. 
  There is no evidence of pulmonary hypertension.

The pulmonic valve was not well visualized.

The aortic root size is normal.

IVC Not well visulized.

There is no pericardial effusion.



CONCLUSIONS

--------

1. Resting tachycardia (HR>100bpm).

2. This was a technically difficult study with suboptimal views.

3. The left ventricular size is normal.

4. There is borderline concentric left ventricular hypertrophy.

5. There is severe global hypokinesis of LV .

6. Overall left ventricular systolic function is severely impaired with, an EF < 20%.

7. The right ventricle is severely enlarged.

8. LA is severely dilated >40 ml/m2

9. RA appears enlarged.

10. 3 ml of Lumason used

11. There is mild aortic valve sclerosis.

12. The mitral valve leaflets are mildly thickened.

13. Moderate-to-severe mitral regurgitation is present.

14. Trace tricuspid regurgitation present.

15. Right ventricular systolic pressure is normal at < 35 mmHg.

16. There is no evidence of pulmonary hypertension.

17. The pulmonic valve was not well visualized.

18. The aortic root size is normal.

19. IVC Not well visulized.

20. There is no pericardial effusion.





SONOGRAPHER: Benjamin Poole RDCS

## 2019-03-19 NOTE — P.NPCON
History of Present Illness





- Reason for Consult


acute renal failure





- History of Present Illness





Reason for consultation: Acute kidney injury





History of present illness:


Patient is a 43-year-old male seen in renal consultation for acute kidney 

injury.  Patient presented to the hospital with dyspnea.  He was also 

hypotensive on admission the systolic blood pressure as low as in the 80s.  

Patient was noted to be in A. fib with RVR and was started on Cardizem drip but 

it was subsequently discontinued due to his low blood pressure.  He is now being

started on amiodarone drip.  Imaging was suggestive of fluid overload with 

bilateral pleural effusions.  He is also on dobutamine as well as Lasix drip.  U

rine output the last couple of hours has been low.  No hematuria or dysuria.  

He's been having vomiting intermittently over the last 1 month.  His baseline 

creatinine is 1 and is elevated at 1.64 today.  He does have history of diabetes

mellitus and was taking metformin at home.  Denies use of nonsteroidals.  

Patient has developmental delay.  A friend is present at bedside.





Vital signs are stable.


General: The patient appeared well nourished and normally developed. 


HEENT: Head exam is unremarkable. Neck is without jugular venous distension.


LUNGS: Breath sounds decreased.


HEART: Rate and Rhythm are regular. First and second heart sounds normal. No 

murmurs, rubs or gallops. 


ABDOMEN: Abdominal exam reveals normal bowel sounds. Non-tender and non-

distended. No evidence of peritonitis.


EXTREMITITES: No clubbing, cyanosis, or edema.





Past Medical History


Past Medical History: Asthma, Osteoarthritis (OA)


Additional Past Medical History / Comment(s): had Hep C and DM2, both resolved 

per patient, pt had blood transfusion as a kid, gout


History of Any Multi-Drug Resistant Organisms: None Reported


Past Surgical History: Orthopedic Surgery


Additional Past Surgical History / Comment(s): both shoulders


Past Psychological History: Anxiety, Panic Disorder


Smoking Status: Never smoker


Past Alcohol Use History: None Reported


Past Drug Use History: None Reported





Medications and Allergies


                                Home Medications











 Medication  Instructions  Recorded  Confirmed  Type


 


Aspirin 325 mg PO DAILY 12/05/17 03/18/19 History


 


Beclomethasone Dipropionate [Qvar 1 puff INHALATION RT-BID 03/18/19 03/18/19 

History





40 mcg Redihaler]    


 


DULoxetine HCL [Cymbalta] 30 mg PO DAILY 03/18/19 03/18/19 History


 


DULoxetine HCL [Cymbalta] 60 mg PO DAILY 03/18/19 03/18/19 History


 


Diclofenac Potassium [Cataflam] 50 mg PO BID 03/18/19 03/18/19 History


 


Simvastatin 10 mg PO HS 03/18/19 03/18/19 History


 


metFORMIN HCL [Glucophage] 500 mg PO DAILY 03/18/19 03/18/19 History








                                    Allergies











Allergy/AdvReac Type Severity Reaction Status Date / Time


 


Iodinated Contrast- Oral and Allergy  Rash/Hives Verified 03/18/19 11:32





IV Dye     





[Iodinated Contrast Media -     





IV Dye]     














Physical Exam


Vitals: 


                                   Vital Signs











  Temp Pulse Pulse Resp BP BP Pulse Ox


 


 03/19/19 06:00   114 H   20  106/95   94 L


 


 03/19/19 05:00   114 H   19  106/95   92 L


 


 03/19/19 04:00  97.8 F  115 H   23  115/102   92 L


 


 03/19/19 03:00   116 H   21  88/67   92 L


 


 03/19/19 02:00   114 H   21  106/87   92 L


 


 03/19/19 01:00   116 H   31 H  106/52   93 L


 


 03/19/19 00:28   115 H   30 H    90 L


 


 03/19/19 00:00  97.6 F  113 H   22  89/65   91 L


 


 03/18/19 23:00   114 H   15  99/75   95


 


 03/18/19 22:00   120 H   26 H  119/69   91 L


 


 03/18/19 21:24        95


 


 03/18/19 21:00   121 H   23  113/85   97


 


 03/18/19 20:00  97.6 F  120 H   22  111/69   95


 


 03/18/19 19:00   120 H   25 H  100/75   98


 


 03/18/19 18:00   121 H   26 H  112/94   98


 


 03/18/19 17:11   122 H   25 H    96


 


 03/18/19 16:40       110/77 


 


 03/18/19 16:30       105/66 


 


 03/18/19 16:22       92/71 


 


 03/18/19 16:20       127/72 


 


 03/18/19 16:15    123 H  24   93/57  96


 


 03/18/19 16:00    123 H  25 H   


 


 03/18/19 15:25  97.8 F  123 H   16  98/68   97


 


 03/18/19 15:00  98.2 F  125 H   18  92/75   99


 


 03/18/19 14:00   124 H   24  115/85   96


 


 03/18/19 13:56        98


 


 03/18/19 13:55   124 H   18  95/63   9 L


 


 03/18/19 13:30   124 H   20  80/70   98


 


 03/18/19 13:00   124 H   23  92/65   96


 


 03/18/19 12:00   133 H   25 H  105/83  


 


 03/18/19 11:41   133 H   20  105/83   95


 


 03/18/19 10:40  98.6 F  134 H   24  115/96   98








                                Intake and Output











 03/18/19 03/19/19 03/19/19





 22:59 06:59 14:59


 


Intake Total 527.833 791.211 38.789


 


Output Total 975 195 


 


Balance -447.167 596.211 38.789


 


Intake:   


 


  IV 90 240 


 


    Furosemide 100 mg In 30 80 





    Sodium Chloride 0.9% 90   





    ml @ 10 MG/HR 10 mls/hr   





    IV .Q10H FELA Rx#:   





    [id number]   


 


    Sodium Chloride 0.9% 500 60 160 





    ml 500 ml @ 20 mls/hr IV   





    .Q24H FELA Rx#:[id number]   


 


  Intake, IV Titration 87.833 201.211 38.789





  Amount   


 


    Furosemide 100 mg In  77.833 





    Sodium Chloride 0.9% 90   





    ml @ 10 MG/HR 10 mls/hr   





    IV .Q10H FELA Rx#:   





    [id number]   


 


    Heparin Sod,Pork in 0.45% 87.833 123.378 38.789





    NaCl 25,000 unit In 0.45   





    % NaCl 1 250ml.bag @ 9.1   





    UNITS/KG/HR 9.906 mls/hr   





    IV .Q24H FELA Rx#:   





    [id number]   


 


  Oral 350 350 


 


Output:   


 


  Urine 975 195 


 


Other:   


 


  Voiding Method Indwelling Catheter Indwelling Catheter 


 


  # Bowel Movements  1 














Results





- Lab Results


                             Most recent lab results











Calcium  8.6 mg/dL (8.4-10.2)   03/19/19  03:55    


 


Phosphorus  5.4 mg/dL (2.5-4.5)  H  03/19/19  03:55    


 


Magnesium  2.3 mg/dL (1.6-2.3)   03/19/19  03:55    














                                 03/19/19 03:55





                                 03/19/19 03:55





Assessment and Plan


Plan: 





Assessment:


1.  Acute kidney injury secondary to ATN secondary to hemodynamic instability 

and cardiorenal syndrome.  Baseline creatinine is 1 and elevated at 1.64 today. 

Patient also received IV contrast dye on March 18.


2.  Systolic CHF.


3.  Volume overload.


4.  Diabetes mellitus.


5.  Hyperkalemia secondary to acute kidney injury and metabolic acidosis.


6.  Metabolic acidosis secondary to acute kidney injury.  


7.  Hypervolemic hyponatremia.


8.  A. fib with RVR maintained on amiodarone drip.





Plan:


Dobutamine dose to be increased.


Maintain Lasix drip at 10 mL an hour.


10 units of IV regular insulin with an amp of D50 now.


2 A of sodium bicarbonate IV push now.


Repeat potassium level this evening.


1200 cc fluid restriction.


Check renal ultrasound.


Continue to monitor renal function and urine output.


Follow-up echocardiogram.





Thank you for the consultation.  I will continue to follow the patient with you 

during his hospital stay.

## 2019-03-19 NOTE — PN
PROGRESS NOTE



Mr. Messina was transferred by me to ICU yesterday when he became cold, clammy and
short

of breath.  Clinically, he was in heart failure.  I initiated him on a Lasix 
drip

yesterday and echocardiogram was performed which revealed ejection fraction of 
less

than 20%.  There is 4 chamber enlargement.  Right ventricle is also enlarged.  
Patient

may have underlying sleep apnea, also. Ejection fraction is probably more like 
10%-15%.

I placed him on Lasix drip.  He had initially good urine response but now the 
urine

output has come down.  His creatinine is slowly climbing up.  I am recommending 
that we

initiate him on a dobutamine drip, continue the Lasix drip, seek Nephrology 
input.

Prognosis is very poor.  There is no family available.  Yesterday, I spoke to 
the wife

who did not seem to comprehend the gravity of the situation.  I will speak to 
her again

today.



Blood pressure today is 110/70, pulse rate is about 112 per minute.  The rhythm 
appears

to be atrial tachycardia.  S1, S2 heard with the tachycardia.  Short systolic 
murmur at

left sternal border.

Lungs revealed fine rales over both bases.  Abdomen is soft, nontender.  Lower

extremities reveal bilateral mild to moderate edema.  Central nervous system, 
grossly

no focal deficits.



IMPRESSION:

1. Severe heart failure with ejection fraction of less than 20% with pan chamber

    enlargement.

2. Probable underlying sleep apnea.

3. Atrial tachycardia, which may be a contributory factor to heart failure.

4. No evidence to suggest any acute myocardial injury or pulmonary embolism.



RECOMMENDATION:

I will continue the Lasix drip, add dobutamine drip and watch the heart rate 
closely.

Seek Nephrology input.  Will continue heparin drip and consider electrical

cardioversion based on how he does. Consider NORM and Cardioversion, although 
some rhythm strips suggest Sinus tachycardia. We will discontinue aspirin, 
metformin and nitro

paste.  Prognosis remains quite poor.





MMODL / IJN: [id number] / Job#: [id number]

MTDD

## 2019-03-19 NOTE — XR
EXAMINATION TYPE: XR chest 1V portable

 

DATE OF EXAM: 3/19/2019

 

Comparison: 3/19/2019, earlier today

 

Clinical History: 43-year-old male Tube placement

 

Findings:

ET tube satisfactory. NG tube courses below the diaphragm, satisfactory. Left IJ CVC tip remains at a
pproximately the mid SVC level currently.

 

Heart remains enlarged. Increasing interstitial changes and patchy left basilar opacity.

 

 

Impression:

 

1. Satisfactory ET and NG tubes. Left IJ CVC tip at the mid SVC level.

2. Cardiomegaly and slight worsening interstitial changes. Correlate for interstitial pulmonary edema
.

3. More focal patchy left basilar atelectasis and/or infiltrate.

## 2019-03-19 NOTE — XR
EXAMINATION TYPE: XR chest 1V portable

 

DATE OF EXAM: 3/19/2019

 

COMPARISON: 3/19/2019

 

HISTORY: Central line placement

 

TECHNIQUE: Single frontal view of the chest is obtained.

 

FINDINGS:  There is a left internal jugular central venous catheter has been placed in the interim te
rminating at the distal superior vena cava. There is midlung left subsegmental atelectasis and mild p
ulmonary vascular congestion. Cardiomegaly is redemonstrated. Postsurgical change of the distal right
 clavicle is noted. No pneumothorax.

 

IMPRESSION:  Interval insertion of the left internal jugular central venous catheter terminating in t
he distal superior vena cava. Left midlung atelectasis and mild pulmonary vascular congestion likely 
on the basis of decompensated congestive heart failure seen.

## 2019-03-19 NOTE — US
EXAMINATION TYPE: US kidneys/renal and bladder

 

DATE OF EXAM: 3/19/2019

 

COMPARISON: NONE

 

CLINICAL HISTORY: efrain. EFRAIN, Diabetes. 

EXAM MEASUREMENTS:

 

Right Kidney:  11.6 X 5.0 X 5.5 cm

Left Kidney: 10.5 X 5.5 X 5.9 cm

Post Void Residual Volume:  Not performed.  Paige in place. 

 

**Limited due to body habitus**

 

Right Kidney: No hydronephrosis or masses seen  

Left Kidney: No hydronephrosis or masses seen  

Bladder: Paige imaged.

**Bilateral Jets seen: No

 

**Incidental finding of pelvic free fluid.**The liver also appears slightly heterogenous with a lobul
ated contour.

 

There is no evidence for hydronephrosis at this point in time.  No nephrolithiasis is seen.  No renal
 masses are identified. 

 

 

IMPRESSION:

1. No hydronephrosis or nephrolithiasis. The urinary bladder is not visualized as it is decompressed 
due to Paige catheter placement.

2. Incidentally noted pelvic free fluid. The liver does appear heterogenous and underlying hepatocell
ular disease with ascites is possible. Correlate with liver function tests.

## 2019-03-19 NOTE — PCN
PROCEDURE NOTE



ARTERIAL LINE PLACEMENT:

Indications:  Hemodynamic monitoring.



A time-out was completed verifying correct patient, procedure, site, positioning, and

implant(s) or special equipment if applicable.



Omi's test was performed to ensure adequate perfusion.  The patient's right wrist was

prepped and draped in sterile fashion.  1% Lidocaine was used to anesthetize the area.

An 18G Arrow arterial line was introduced into the  radial artery.  The catheter was

threaded over the guide wire and the needle was removed with appropriate pulsatile

blood return.  Blood loss was minimal.  The catheter was then sutured in place to the

skin and a sterile dressing applied.  Perfusion to the extremity distal to the point of

catheter insertion was checked and found to be adequate.

Good waveform was noted, line was flushed, sutured in place, sterile dressing was

applied.



The patient tolerated the procedure well and there were no complications.





MMMONSERRAT / BRIANN: [id number] / Job#: [id number]

## 2019-03-19 NOTE — PN
PROGRESS NOTE



Mr. Messina has been evaluated by me today on multiple occasions.  I requested Dr. YIN Corral to perform a NORM cardioversion because I felt the patient was in atrial flutter

or atrial tachycardia; more like atrial flutter.  However, Dr. Corral gave him some

adenosine, without much improvement.  Subsequently patient became more hypotensive,

unresponsive and had to be intubated.  His blood pressure also came down substantially.

We placed him on a Levophed drip and continued the dobutamine at 5 mcg. I then

performed an electrical cardioversion in view of his hemodynamic instability.  With

this he converted to sinus rhythm at a rate of about 80 beats per minute.  However, he

continued to do poorly, and I believe we are dealing with a cardiogenic shock with a

poor pump.  He will benefit from Impella, which will need to be put in at least for 48

to 72 hours.  Since we are not equipped to do a continued management of Impella, I

recommended him to be transferred to a tertiary care center.  I will make arrangements

for him to be transferred to [facility].  I talked to the patient's wife at

length.  She is also somewhat mentally challenged and seemed very overwhelmed.  After a

long discussion with the help of her friend Andreia, I was able to explain to the

patient in detail, and she agreed for the transfer.  We will therefore make transfer

arrangements by [facility] ambulance on Levophed and dobutamine drips to go to the cath lab

at [facility] for a mechanical support for his cardiogenic shock.  Prognosis

remains poor, and this was made explicitly clear to the patient's wife and also her

friend, Andreia.  Prognosis is very poor.  Patient has an arterial line and a central

line.  Blood pressure is about 94 systolic and he is on 18 mcg of Levophed and 5 mcg of

dobutamine.  Prognosis remains poor.





MMODL / IJN: [id number] / Job#: [id number]
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
## 2019-03-20 NOTE — PCN
PROCEDURE NOTE



DATE OF SERVICE:

03/19/2019



TRIPLE LUMEN CATHETER PLACEMENT:

Indication:  Hemodynamic monitoring/Intravenous access.



PREOPERATIVE DIAGNOSIS:

Acute heart failure and shock.



POSTOPERATIVE DIAGNOSIS:

Acute heart failure and shock.



DESCRIPTION OF PROCEDURE:

A time-out was completed verifying correct patient, procedure, site, positioning, and

implant(s) or special equipment if applicable.



The patient was placed in a dependent position appropriate for triple lumen catheter

placement based on the vein to be cannulated.  The patient's left shoulder was prepped

and draped in sterile fashion.  1% Lidocaine was used to anesthetize the surrounding

skin area.  A triple lumen 9F Cordis catheter was introduced into the left internal

jugular vein using Seldinger technique.  The catheter was threaded smoothly over the

guide wire and appropriate blood return was obtained.  Each lumen of the catheter was

evacuated of air and flushed with sterile saline.  The catheter was then sutured in

place to the skin and a sterile dressing applied.  Perfusion to the extremity distal to

the point of catheter insertion was checked and found to be adequate.



There were no bedside complications or bleeding.  No pneumothorax on the post-procedure

chest x-ray.





MMODL / IJN: [id number] / Job#: [id number]

## 2024-04-02 NOTE — P.PN
Subjective


Progress Note Date: 03/19/19


Interval history: This is a 43-year-old gentleman admitted in the ICU with 

cardiogenic shock and multiple other medical issues.  Evaluated by multiple 

consults; intensivist/pulmonary, nephrology, cardiology. Echo reporting EF less 

than 20% with dilated RA, dilated RV and moderate to severe mitral regurgita

tion.  Maintained on heparin, dobutamine, amiodarone and Lasix drips.  

Creatinine up to 1.6, potassium 5.9.  Chest x-ray reporting cardiomegaly, 

pulmonary edema.  Condition is critical.





Review systems unable to obtain at this time.





                             Active Medication List








Discontinued Medications





Hydrocodone Bitart/Acetaminophen (Norco 5-325)  1 each PO Q6HR PRN


   PRN Reason: Pain


Adenosine (Adenocard)  12 mg IVP ONCE STA


   Stop: 03/19/19 13:52


   Last Admin: 03/19/19 13:45  Dose: 12 mg


   Documented by: KAIT


Albuterol/Ipratropium (Duoneb 0.5 Mg-3 Mg/3 Ml Soln)  3 ml INHALATION RT-QID PRN


   PRN Reason: Shortness Of Breath Or Wheezing


Albuterol/Ipratropium (Duoneb 0.5 Mg-3 Mg/3 Ml Soln)  3 ml INHALATION RT-Q2H PRN


   PRN Reason: Shortness Of Breath Or Wheezing


Alprazolam (Xanax)  0.25 mg PO TID PRN


   PRN Reason: Anxiety


   Last Admin: 03/18/19 23:10  Dose: 0.25 mg


   Documented by: MARGARETTE


Aspirin (Aspirin)  324 mg PO ONCE STA


   Stop: 03/18/19 10:41


   Last Admin: 03/18/19 11:28  Dose: 324 mg


   Documented by: ANTOINE


Aspirin (Aspirin)  325 mg PO DAILY [facility]


Atorvastatin Calcium (Lipitor)  10 mg PO HS [facility]


   Last Admin: 03/18/19 20:57  Dose: 10 mg


   Documented by: MARGARETTE


Budesonide (Pulmicort)  1 mg INHALATION RT-BID [facility]


Chlorhexidine Gluconate (Peridex)  15 ml MUCOUS MEM BID [facility]


Dextrose/Water (Dextrose 50%-Water Syringe)  50 ml IVP ONCE STA


   Stop: 03/19/19 09:41


   Last Admin: 03/19/19 10:06  Dose: 50 ml


   Documented by: KAIT


Diphenhydramine HCl (Benadryl)  50 mg IVP ONCE STA


   Stop: 03/18/19 12:35


   Last Admin: 03/18/19 12:35  Dose: 50 mg


   Documented by: ANTOINE


Docusate Sodium (Colace)  100 mg PO BID [facility]


   Last Admin: 03/19/19 08:37 Dose:  Not Given


   Documented by: KAIT


   Non-Admin Reason: given kayexalate


   Admin: 03/18/19 21:04  Dose: 100 mg


   Documented by: MARGARETTE


Duloxetine HCl (Cymbalta)  90 mg PO DAILY [facility]


   Last Admin: 03/19/19 08:45  Dose: 90 mg


   Documented by: KAIT


Famotidine (Pepcid)  20 mg IV ONCE STA


   Stop: 03/18/19 12:35


   Last Admin: 03/18/19 12:36  Dose: 20 mg


   Documented by: ANTOINE


Fluticasone Propionate (Flovent 44 Mcg Inhaler)  1 puff INHALATION RT-BID [facility]


   Last Admin: 03/19/19 15:13 Dose:  Not Given


   Documented by: FLOR


   Non-Admin Reason: not given this am.


   Admin: 03/18/19 21:23  Dose: 1 puff


   Documented by: ESTHELA


Furosemide (Lasix)  20 mg IV Q12HR [facility]


Furosemide (Lasix)  40 mg IV ONCE STA


   Stop: 03/18/19 16:36


   Last Admin: 03/18/19 16:37  Dose: 40 mg


   Documented by: RAJANI


Heparin Sodium (Porcine) (Heparin)  4,000 unit IV ONCE ONE


   Stop: 03/18/19 10:43


   Last Admin: 03/18/19 11:29  Dose: 4,000 unit


   Documented by: ANTOINE


Heparin Sodium/Sodium Chloride (25,000 unit/ Sodium Chloride)  250 mls @ 9.906 

mls/hr IV .Q24H [facility]; Protocol


   Last Admin: 03/19/19 08:36  Dose: 14.1 units/kg/hr, 15.35 mls/hr


   Documented by: KAIT   Cosigned by: MONZOVICKY


   Titration: 03/19/19 08:23  Dose: 14.1 units/kg/hr, 15.35 mls/hr


   Documented by: KAIT   Cosigned by: MONZOEL


   Titration: 03/19/19 05:51  Dose: 14.1 units/kg/hr, 15.35 mls/hr


   Documented by: MARGARETTE   Cosigned by: LISET


   Titration: 03/18/19 20:29  Dose: 12.1 units/kg/hr, 13.172 mls/hr


   Documented by: MARGARETTE   Cosigned by: LISET


   Admin: 03/18/19 11:37  Dose: 9.1 units/kg/hr, 9.906 mls/hr


   Documented by: ANTOINE   Cosigned by: RICK


Diltiazem HCl 125 mg/ Sodium (Chloride)  125 mls @ 7.5 mls/hr IV .[id number] [facility]


   Last Infusion: 03/18/19 14:39  Dose: 2.5 mg/hr, 2.5 mls/hr


   Documented by: ANTOINE


   Admin: 03/18/19 11:39  Dose: 7.5 mg/hr, 7.5 mls/hr


   Documented by: ANTOINE


Sodium Chloride (Saline 0.9%)  1,000 mls @ 999 mls/hr IV .Q1H1M ONE


   Stop: 03/18/19 14:35


   Last Admin: 03/18/19 13:54  Dose: 999 mls/hr


   Documented by: JULIÁN


Sodium Chloride (Saline 0.9%)  500 mls @ 20 mls/hr IV .Q24H [facility]


   Last Admin: 03/19/19 15:48  Dose: 20 mls/hr


   Documented by: KAIT


   Admin: 03/18/19 14:41  Dose: 20 mls/hr


   Documented by: ANTOINE


Furosemide 100 mg/ Sodium (Chloride)  100 mls @ 15 mls/hr IV .Q6H40M [facility]


   Last Admin: 03/19/19 11:31  Dose: 10 mg/hr, 10 mls/hr


   Documented by: KAIT


   Infusion: 03/19/19 11:26  Dose: 10 mg/hr, 10 mls/hr


   Documented by: KAIT


   Admin: 03/19/19 01:26  Dose: 10 mg/hr, 10 mls/hr


   Documented by: MARGARETTE


   Infusion: 03/19/19 01:26  Dose: 10 mg/hr, 10 mls/hr


   Documented by: MARGARETTE


   Admin: 03/18/19 17:39  Dose: 10 mg/hr, 10 mls/hr


   Documented by: GREGORY


Dobutamine HCl/Dextrose 500 mg (/ IV Solution)  250 mls @ 8.165 mls/hr IV .Q24H 

[facility]


   Last Admin: 03/19/19 14:25  Dose: 5 mcg/kg/min, 16.329 mls/hr


   Documented by: KAIT


   Infusion: 03/19/19 14:25  Dose: 5 mcg/kg/min, 16.329 mls/hr


   Documented by: KAIT


   Infusion: 03/19/19 09:00  Dose: 5 mcg/kg/min, 16.329 mls/hr


   Documented by: KAIT


   Admin: 03/19/19 08:36  Dose: 2.5 mcg/kg/min, 8.165 mls/hr


   Documented by: KAIT


Amiodarone HCl 150 mg/ (Dextrose/Water)  103 mls @ 618 mls/hr IV .Q10M ONE


   Stop: 03/19/19 09:17


   Last Admin: 03/19/19 09:36  Dose: 618 mls/hr


   Documented by: KAIT


Amiodarone HCl 360 mg/ (Dextrose/Water)  200 mls @ 33.333 mls/hr IV .Q6H ONE; 

Protocol


   Stop: 03/19/19 15:07


   Last Admin: 03/19/19 10:50  Dose: 1 mg/min, 33.333 mls/hr


   Documented by: KAIT


Amiodarone HCl 300 mg/ (Dextrose/Water)  250 mls @ 25 mls/hr IV .Q10H [facility]; 

Protocol


   Stop: 03/20/19 08:59


Propofol 1,000 mg/ IV Solution  100 mls @ 0 mls/hr IV .Q0M [facility]; Protocol


   Last Titration: 03/19/19 14:56  Dose: 25 mcg/kg/min, 16.329 mls/hr


   Documented by: KAIT


   Titration: 03/19/19 14:25  Dose: 20 mcg/kg/min, 13.063 mls/hr


   Documented by: KAIT


   Admin: 03/19/19 14:10  Dose: 10 mcg/kg/min, 6.532 mls/hr


   Documented by: KAIT


Norepinephrine Bitartrate 32 (mg/ Sodium Chloride)  250 mls @ 2.551 mls/hr IV 

.Q24H [facility]; Protocol


   Last Titration: 03/19/19 15:52  Dose: 0.21 mcg/kg/min, 10.716 mls/hr


   Documented by: KAIT


   Titration: 03/19/19 15:47  Dose: 0.18 mcg/kg/min, 9.185 mls/hr


   Documented by: KAIT


   Titration: 03/19/19 15:46  Dose: 0.1 mcg/kg/min, 5.103 mls/hr


   Documented by: KAIT


   Admin: 03/19/19 14:50  Dose: 0.08 mcg/kg/min, 4.082 mls/hr


   Documented by: KAIT


Insulin Aspart (Novolog)  0 unit SQ Q6H [facility]; Protocol


Insulin Human Regular (Humulin R)  10 unit IV ONCE ONE


   Stop: 03/19/19 09:41


   Last Admin: 03/19/19 10:06  Dose: 10 unit


   Documented by: KAIT   Cosigned by: JUDITH


Metformin HCl (Glucophage)  500 mg PO AC-BRKT [facility]


Methylprednisolone Sodium Succinate (Solu-Medrol)  125 mg IV ONCE STA


   Stop: 03/18/19 12:35


   Last Admin: 03/18/19 12:35  Dose: 125 mg


   Documented by: ANTOINE


Metolazone (Zaroxolyn)  5 mg PO DAILY [facility]


   Last Admin: 03/19/19 14:58 Dose:  Not Given


   Documented by: KAIT


   Non-Admin Reason: Per Dr. Boyle


Metoprolol Tartrate (Lopressor)  25 mg PO ONCE STA


   Stop: 03/18/19 10:48


   Last Admin: 03/18/19 11:29  Dose: 25 mg


   Documented by: ANTOINE


Metoprolol Tartrate (Lopressor)  25 mg PO BID [facility]


   Last Admin: 03/18/19 21:04  Dose: 25 mg


   Documented by: MARGARETTE


Metoprolol Tartrate (Lopressor)  25 mg PO BID [facility]


   Last Admin: 03/19/19 08:45  Dose: 25 mg


   Documented by: KAIT


Nitroglycerin (Nitro-Bid Oint)  1 inch TOPICAL ONCE STA


   Stop: 03/18/19 10:41


   Last Admin: 03/18/19 11:28  Dose: 1 inch


   Documented by: ANTOINE


Nitroglycerin (Nitro-Bid Oint)  1 inch TOPICAL Q6HR [facility]


   Last Admin: 03/19/19 01:08  Dose: 1 inch


   Documented by: MARGARETTE


   Admin: 03/18/19 17:37  Dose: 1 inch


   Documented by: GREGORY


Nitroglycerin (Nitrostat)  0.4 mg SUBLINGUAL Q5M PRN


   PRN Reason: Chest Pain


Pantoprazole Sodium (Protonix)  40 mg IV DAILY [facility]


   Last Admin: 03/19/19 08:46  Dose: 40 mg


   Documented by: KAIT


Senna (Senokot)  8.6 mg PO BID PRN


   PRN Reason: Constipation


Sodium Bicarbonate (Sodium Bicarb 8.4% Syr (1 Meq/Ml))  100 ml IV ONCE ONE


   Stop: 03/19/19 09:39


   Last Admin: 03/19/19 10:05  Dose: 100 ml


   Documented by: KAIT


Sodium Polystyrene Sulfonate (Kayexalate)  30 gm PO ONCE STA


   Stop: 03/19/19 08:13


   Last Admin: 03/19/19 08:45  Dose: 30 gm


   Documented by: KAIT


Spironolactone (Aldactone)  25 mg PO DAILY [facility]


   Last Admin: 03/19/19 14:58 Dose:  Not Given


   Documented by: KAIT


   Non-Admin Reason: Per doctor boyle


Temazepam (Restoril)  15 mg PO HS PRN


   PRN Reason: Insomnia


   Last Admin: 03/18/19 23:10  Dose: 15 mg


   Documented by: MARGARETTE











Objective





- Vital Signs


Vital signs: 


                                   Vital Signs











Temp  97.8 F   03/19/19 04:00


 


Pulse  114 H  03/19/19 06:00


 


Resp  20   03/19/19 06:00


 


BP  106/95   03/19/19 06:00


 


Pulse Ox  94 L  03/19/19 06:00








                                 Intake & Output











 03/18/19 03/19/19 03/19/19





 18:59 06:59 18:59


 


Intake Total 22.5 1319.044 38.789


 


Output Total 900 570 


 


Balance -877.5 749.044 38.789


 


Weight 108.862 kg  


 


Intake:   


 


  IV  330 


 


    Furosemide 100 mg In  110 





    Sodium Chloride 0.9% 90   





    ml @ 10 MG/HR 10 mls/hr   





    IV .Q10H [facility] Rx#:   





    [id number]   


 


    Sodium Chloride 0.9% 500  220 





    ml 500 ml @ 20 mls/hr IV   





    .Q24H [facility] Rx#:[id number]   


 


  Intake, IV Titration 22.5 289.044 38.789





  Amount   


 


    Diltiazem 125 mg In 22.5  





    Sodium Chloride 0.9% 100   





    ml @ 7.5 MG/HR 7.5 mls/hr   





    IV .F69S26S [facility] Rx#:   





    [id number]   


 


    Furosemide 100 mg In  77.833 





    Sodium Chloride 0.9% 90   





    ml @ 10 MG/HR 10 mls/hr   





    IV .Q10H [facility] Rx#:   





    [id number]   


 


    Heparin Sod,Pork in 0.45%  211.211 38.789





    NaCl 25,000 unit In 0.45   





    % NaCl 1 250ml.bag @ 9.1   





    UNITS/KG/HR 9.906 mls/hr   





    IV .Q24H [facility] Rx#:   





    [id number]   


 


  Oral  700 


 


Output:   


 


  Urine 900 570 


 


Other:   


 


  Voiding Method Indwelling Catheter Indwelling Catheter 


 


  # Bowel Movements  1 














- Exam


PHYSICAL EXAM:


VITAL SIGNS: [As above]


GENERAL: Awake ,Sitting up in bed, increased respiratory effort


HEENT:  Conjunctivae normal. eyes normal.  Oral mucosa dry


NECK:  No JVD. No thyroid enlargement. No LNs


CARDIOVASCULAR:  S1, S2 muffled.  Tachycardic No murmur


RESPIRATION: Breath sounds diminished, fine bibasilar crackles.  Scattered 

rhonchi


ABDOMEN:  Soft,  nontender . No guarding. no masses palpable. No ascites, Bowel 

sounds heard.


LEGS:  Positive edema. no cyanosis or clubbing


NERVOUS SYSTEM: Alert, Moves all 4 limbs.  Diffuse weakness No focal deficits.


Skin: no ulcer no rash














- Labs


CBC & Chem 7: 


                                 03/19/19 03:55





                                 03/19/19 14:55


Labs: 


                  Abnormal Lab Results - Last 24 Hours (Table)











  03/18/19 03/18/19 03/18/19 Range/Units





  10:39 10:39 10:39 


 


MCHC     (31.0-37.0)  g/dL


 


Neutrophils #     (1.3-7.7)  k/uL


 


PT   12.4 H   (9.0-12.0)  sec


 


INR   1.2 H   (<1.2)  


 


APTT     (22.0-30.0)  sec


 


D-Dimer   10.15 H   (<0.60)  mg/L FEU


 


Sodium  131 L    (137-145)  mmol/L


 


Potassium     (3.5-5.1)  mmol/L


 


Chloride  94 L    ()  mmol/L


 


Carbon Dioxide     (22-30)  mmol/L


 


BUN  27 H    (9-20)  mg/dL


 


Creatinine     (0.66-1.25)  mg/dL


 


Glucose  126 H    (74-99)  mg/dL


 


POC Glucose (mg/dL)     (75-99)  mg/dL


 


Phosphorus     (2.5-4.5)  mg/dL


 


Total Bilirubin  2.0 H    (0.2-1.3)  mg/dL


 


AST  131 H    (17-59)  U/L


 


ALT  367 H    (21-72)  U/L


 


Alkaline Phosphatase  132 H    ()  U/L


 


Troponin I    0.035 H*  (0.000-0.034)  ng/mL


 


HDL Cholesterol     (40-60)  mg/dL


 


Ur Specific Gravity     (1.001-1.035)  


 


Urine Protein     (Negative)  


 


Urine Mucus     (None)  /hpf














  03/18/19 03/18/19 03/18/19 Range/Units





  15:43 16:19 17:04 


 


MCHC     (31.0-37.0)  g/dL


 


Neutrophils #     (1.3-7.7)  k/uL


 


PT     (9.0-12.0)  sec


 


INR     (<1.2)  


 


APTT     (22.0-30.0)  sec


 


D-Dimer     (<0.60)  mg/L FEU


 


Sodium     (137-145)  mmol/L


 


Potassium     (3.5-5.1)  mmol/L


 


Chloride     ()  mmol/L


 


Carbon Dioxide     (22-30)  mmol/L


 


BUN     (9-20)  mg/dL


 


Creatinine     (0.66-1.25)  mg/dL


 


Glucose     (74-99)  mg/dL


 


POC Glucose (mg/dL)   134 H  148 H  (75-99)  mg/dL


 


Phosphorus     (2.5-4.5)  mg/dL


 


Total Bilirubin     (0.2-1.3)  mg/dL


 


AST     (17-59)  U/L


 


ALT     (21-72)  U/L


 


Alkaline Phosphatase     ()  U/L


 


Troponin I  0.043 H*    (0.000-0.034)  ng/mL


 


HDL Cholesterol     (40-60)  mg/dL


 


Ur Specific Gravity     (1.001-1.035)  


 


Urine Protein     (Negative)  


 


Urine Mucus     (None)  /hpf














  03/18/19 03/19/19 03/19/19 Range/Units





  21:07 03:55 03:55 


 


MCHC   30.3 L   (31.0-37.0)  g/dL


 


Neutrophils #   7.9 H   (1.3-7.7)  k/uL


 


PT     (9.0-12.0)  sec


 


INR     (<1.2)  


 


APTT     (22.0-30.0)  sec


 


D-Dimer     (<0.60)  mg/L FEU


 


Sodium    127 L  (137-145)  mmol/L


 


Potassium    5.9 H  (3.5-5.1)  mmol/L


 


Chloride    95 L  ()  mmol/L


 


Carbon Dioxide    20 L  (22-30)  mmol/L


 


BUN    36 H  (9-20)  mg/dL


 


Creatinine    1.64 H  (0.66-1.25)  mg/dL


 


Glucose    136 H  (74-99)  mg/dL


 


POC Glucose (mg/dL)     (75-99)  mg/dL


 


Phosphorus    5.4 H  (2.5-4.5)  mg/dL


 


Total Bilirubin     (0.2-1.3)  mg/dL


 


AST     (17-59)  U/L


 


ALT     (21-72)  U/L


 


Alkaline Phosphatase     ()  U/L


 


Troponin I     (0.000-0.034)  ng/mL


 


HDL Cholesterol    23 L  (40-60)  mg/dL


 


Ur Specific Gravity  1.042 H    (1.001-1.035)  


 


Urine Protein  2+ H    (Negative)  


 


Urine Mucus  Rare H    (None)  /hpf














  03/19/19 Range/Units





  03:55 


 


MCHC   (31.0-37.0)  g/dL


 


Neutrophils #   (1.3-7.7)  k/uL


 


PT   (9.0-12.0)  sec


 


INR   (<1.2)  


 


APTT  40.9 H  (22.0-30.0)  sec


 


D-Dimer   (<0.60)  mg/L FEU


 


Sodium   (137-145)  mmol/L


 


Potassium   (3.5-5.1)  mmol/L


 


Chloride   ()  mmol/L


 


Carbon Dioxide   (22-30)  mmol/L


 


BUN   (9-20)  mg/dL


 


Creatinine   (0.66-1.25)  mg/dL


 


Glucose   (74-99)  mg/dL


 


POC Glucose (mg/dL)   (75-99)  mg/dL


 


Phosphorus   (2.5-4.5)  mg/dL


 


Total Bilirubin   (0.2-1.3)  mg/dL


 


AST   (17-59)  U/L


 


ALT   (21-72)  U/L


 


Alkaline Phosphatase   ()  U/L


 


Troponin I   (0.000-0.034)  ng/mL


 


HDL Cholesterol   (40-60)  mg/dL


 


Ur Specific Gravity   (1.001-1.035)  


 


Urine Protein   (Negative)  


 


Urine Mucus   (None)  /hpf














Assessment and Plan


Assessment: 


-Acute CHF exacerbation, systolic dysfunction, EF less than 20% with moderate to

severe mitral regurgitation.  Appears to be nonischemic cardiomyopathy. 

Cardiogenic shock.


-Acute hypoxic respiratory failure secondary to the above


-Acute pulmonary edema secondary to the above


-Acute renal failure, cardiorenal, possibly contrast-induced, baseline c

reatinine 1.


-Atrial fibrillation/flutter, 2:1 block, maintained on amiodarone drip


-Obesity, BMI 32.5


-Diabetes mellitus


-Hyperlipidemia


-Mentally challenged


-Chronic bronchial asthma














Plan: Continue on current medication regime , Lasix, dobutamine, amiodarone, 

heparin drips ,monitoring and symptomatic treatment.  1200 mL fluid restriction.

Receiving hyperkalemia regimen with repeat potassium level ordered.  Renal 

ultrasound pending.  Prognosis guarded, critical ,given multiple complex medical

issues.  Further recommendations pending.  used